# Patient Record
Sex: MALE | Race: BLACK OR AFRICAN AMERICAN | NOT HISPANIC OR LATINO | Employment: OTHER | URBAN - METROPOLITAN AREA
[De-identification: names, ages, dates, MRNs, and addresses within clinical notes are randomized per-mention and may not be internally consistent; named-entity substitution may affect disease eponyms.]

---

## 2020-01-07 PROBLEM — M62.82 NON-TRAUMATIC RHABDOMYOLYSIS: Status: ACTIVE | Noted: 2020-01-07

## 2020-01-07 PROBLEM — E87.6 HYPOKALEMIA: Status: ACTIVE | Noted: 2020-01-07

## 2020-01-07 PROBLEM — F23 ACUTE PSYCHOSIS: Status: ACTIVE | Noted: 2020-01-07

## 2020-01-07 PROBLEM — M62.82 RHABDOMYOLYSIS: Status: ACTIVE | Noted: 2020-01-07

## 2020-01-08 PROBLEM — F20.9 SCHIZOPHRENIA: Status: ACTIVE | Noted: 2020-01-08

## 2020-01-09 PROBLEM — F15.959 STIMULANT-INDUCED PSYCHOTIC DISORDER: Status: ACTIVE | Noted: 2020-01-09

## 2020-01-09 PROBLEM — F17.200 TOBACCO USE DISORDER: Status: ACTIVE | Noted: 2020-01-09

## 2020-01-09 PROBLEM — F12.10 CANNABIS USE DISORDER, MILD, ABUSE: Status: ACTIVE | Noted: 2020-01-09

## 2020-01-09 PROBLEM — F15.90 STIMULANT USE DISORDER: Status: ACTIVE | Noted: 2020-01-09

## 2020-08-17 ENCOUNTER — PATIENT OUTREACH (OUTPATIENT)
Dept: ADMINISTRATIVE | Facility: OTHER | Age: 30
End: 2020-08-17

## 2024-10-11 ENCOUNTER — HOSPITAL ENCOUNTER (EMERGENCY)
Facility: HOSPITAL | Age: 34
Discharge: PSYCHIATRIC OR SUBSTANCE USE FACILITY | End: 2024-10-12
Attending: EMERGENCY MEDICINE

## 2024-10-11 ENCOUNTER — HOSPITAL ENCOUNTER (EMERGENCY)
Facility: HOSPITAL | Age: 34
Discharge: HOME | End: 2024-10-11
Attending: FAMILY MEDICINE | Admitting: FAMILY MEDICINE

## 2024-10-11 VITALS
WEIGHT: 150 LBS | TEMPERATURE: 98 F | OXYGEN SATURATION: 100 % | RESPIRATION RATE: 16 BRPM | BODY MASS INDEX: 23.54 KG/M2 | SYSTOLIC BLOOD PRESSURE: 118 MMHG | DIASTOLIC BLOOD PRESSURE: 68 MMHG | HEIGHT: 67 IN | HEART RATE: 82 BPM

## 2024-10-11 DIAGNOSIS — R07.9 CHEST PAIN: ICD-10-CM

## 2024-10-11 DIAGNOSIS — Z91.199 NONCOMPLIANCE: ICD-10-CM

## 2024-10-11 DIAGNOSIS — F29 PSYCHOSIS, UNSPECIFIED PSYCHOSIS TYPE: ICD-10-CM

## 2024-10-11 DIAGNOSIS — F20.9 SCHIZOPHRENIA, UNSPECIFIED TYPE: Primary | ICD-10-CM

## 2024-10-11 LAB
ALBUMIN SERPL BCP-MCNC: 4.8 G/DL (ref 3.5–5)
ALBUMIN/GLOB SERPL: 1.3 {RATIO}
ALP SERPL-CCNC: 87 U/L (ref 45–115)
ALT SERPL W P-5'-P-CCNC: 23 U/L (ref 16–61)
AMPHET UR QL SCN: POSITIVE
ANION GAP SERPL CALCULATED.3IONS-SCNC: 14 MMOL/L (ref 7–16)
AST SERPL W P-5'-P-CCNC: 28 U/L (ref 15–37)
BARBITURATES UR QL SCN: NEGATIVE
BENZODIAZ METAB UR QL SCN: NEGATIVE
BILIRUB SERPL-MCNC: 1.2 MG/DL (ref ?–1.2)
BUN SERPL-MCNC: 23 MG/DL (ref 7–18)
BUN/CREAT SERPL: 21 (ref 6–20)
CALCIUM SERPL-MCNC: 10.1 MG/DL (ref 8.5–10.1)
CANNABINOIDS UR QL SCN: POSITIVE
CHLORIDE SERPL-SCNC: 98 MMOL/L (ref 98–107)
CO2 SERPL-SCNC: 23 MMOL/L (ref 21–32)
COCAINE UR QL SCN: NEGATIVE
CREAT SERPL-MCNC: 1.12 MG/DL (ref 0.7–1.3)
EGFR (NO RACE VARIABLE) (RUSH/TITUS): 90 ML/MIN/1.73M2
GLOBULIN SER-MCNC: 3.7 G/DL (ref 2–4)
GLUCOSE SERPL-MCNC: 73 MG/DL (ref 74–106)
NT-PROBNP SERPL-MCNC: 34 PG/ML (ref 1–125)
OPIATES UR QL SCN: NEGATIVE
PCP UR QL SCN: NEGATIVE
POTASSIUM SERPL-SCNC: 3.8 MMOL/L (ref 3.5–5.1)
PROT SERPL-MCNC: 8.5 G/DL (ref 6.4–8.2)
SODIUM SERPL-SCNC: 131 MMOL/L (ref 136–145)
TROPONIN I SERPL DL<=0.01 NG/ML-MCNC: 5.6 PG/ML

## 2024-10-11 PROCEDURE — 94761 N-INVAS EAR/PLS OXIMETRY MLT: CPT

## 2024-10-11 PROCEDURE — 80307 DRUG TEST PRSMV CHEM ANLYZR: CPT | Performed by: EMERGENCY MEDICINE

## 2024-10-11 PROCEDURE — 99900035 HC TECH TIME PER 15 MIN (STAT)

## 2024-10-11 PROCEDURE — 83880 ASSAY OF NATRIURETIC PEPTIDE: CPT | Performed by: FAMILY MEDICINE

## 2024-10-11 PROCEDURE — 99285 EMERGENCY DEPT VISIT HI MDM: CPT | Mod: 25

## 2024-10-11 PROCEDURE — 85025 COMPLETE CBC W/AUTO DIFF WBC: CPT | Performed by: FAMILY MEDICINE

## 2024-10-11 PROCEDURE — 80053 COMPREHEN METABOLIC PANEL: CPT | Performed by: FAMILY MEDICINE

## 2024-10-11 PROCEDURE — 36415 COLL VENOUS BLD VENIPUNCTURE: CPT | Performed by: EMERGENCY MEDICINE

## 2024-10-11 PROCEDURE — 96372 THER/PROPH/DIAG INJ SC/IM: CPT | Performed by: EMERGENCY MEDICINE

## 2024-10-11 PROCEDURE — 84484 ASSAY OF TROPONIN QUANT: CPT | Performed by: FAMILY MEDICINE

## 2024-10-11 PROCEDURE — 99284 EMERGENCY DEPT VISIT MOD MDM: CPT | Mod: 25

## 2024-10-11 PROCEDURE — 82077 ASSAY SPEC XCP UR&BREATH IA: CPT | Performed by: EMERGENCY MEDICINE

## 2024-10-11 PROCEDURE — 93005 ELECTROCARDIOGRAM TRACING: CPT

## 2024-10-11 PROCEDURE — 63600175 PHARM REV CODE 636 W HCPCS: Performed by: EMERGENCY MEDICINE

## 2024-10-11 RX ORDER — BENZTROPINE MESYLATE 1 MG/1
1 TABLET ORAL 2 TIMES DAILY
COMMUNITY
Start: 2024-07-24

## 2024-10-11 RX ORDER — TRAZODONE HYDROCHLORIDE 50 MG/1
50 TABLET ORAL NIGHTLY
COMMUNITY
Start: 2024-07-25

## 2024-10-11 RX ORDER — RISPERIDONE 4 MG/1
4 TABLET ORAL
COMMUNITY
Start: 2024-07-24

## 2024-10-11 RX ORDER — FLUOXETINE HYDROCHLORIDE 40 MG/1
40 CAPSULE ORAL
COMMUNITY
Start: 2024-08-14

## 2024-10-11 RX ORDER — BUSPIRONE HYDROCHLORIDE 10 MG/1
10 TABLET ORAL 3 TIMES DAILY
COMMUNITY
Start: 2024-07-25

## 2024-10-11 RX ORDER — HALOPERIDOL 5 MG/ML
10 INJECTION INTRAMUSCULAR
Status: COMPLETED | OUTPATIENT
Start: 2024-10-11 | End: 2024-10-11

## 2024-10-11 RX ADMIN — HALOPERIDOL LACTATE 10 MG: 5 INJECTION, SOLUTION INTRAMUSCULAR at 09:10

## 2024-10-11 NOTE — ED PROVIDER NOTES
Carinaounter Date: 10/11/2024    SCRIBE #1 NOTE: I, Nancy Torres, am scribing for, and in the presence of,  Neri Andujar DO. I have scribed the entire note.       History     Chief Complaint   Patient presents with    Chest Pain    Anxiety     34 y.o.male presented to the ED via EMS for a chest pains. He was found walking to the store. He has anxiety, chest pains, and hand cramping. Pt has HX of smoking and has a medical HX of Anxiety, Genital herpes, Psychosis, Rhabdomyolysis, Schizophrenia, and Stimulant-induced psychotic disorder.           The history is provided by the EMS personnel. No  was used.     Review of patient's allergies indicates:  No Known Allergies  Past Medical History:   Diagnosis Date    Anxiety     Genital herpes     Psychosis     Rhabdomyolysis     Schizophrenia     Stimulant-induced psychotic disorder      Past Surgical History:   Procedure Laterality Date    APPENDECTOMY       No family history on file.  Social History     Tobacco Use    Smoking status: Every Day     Current packs/day: 0.50     Types: Cigarettes    Smokeless tobacco: Never   Substance Use Topics    Alcohol use: Yes    Drug use: Yes     Types: Marijuana     Review of Systems   Cardiovascular:  Positive for chest pain.   All other systems reviewed and are negative.      Physical Exam     Initial Vitals [10/11/24 1654]   BP Pulse Resp Temp SpO2   (!) 127/55 105 (!) 24 98.4 °F (36.9 °C) 98 %      MAP       --         Physical Exam    Nursing note and vitals reviewed.  Constitutional: He appears well-developed and well-nourished.   HENT:   Head: Normocephalic and atraumatic.   Eyes: Conjunctivae and EOM are normal. Pupils are equal, round, and reactive to light.   Neck: Neck supple.   Normal range of motion.  Cardiovascular:  Normal rate, regular rhythm, normal heart sounds and intact distal pulses.           Pulmonary/Chest: Breath sounds normal.   Abdominal: Abdomen is soft. Bowel sounds are  normal.   Musculoskeletal:         General: Normal range of motion.      Cervical back: Normal range of motion and neck supple.     Neurological: He is alert and oriented to person, place, and time. He has normal strength.   Skin: Skin is warm and dry. Capillary refill takes less than 2 seconds.   Psychiatric: He has a normal mood and affect. Thought content normal.         ED Course   Procedures  Labs Reviewed   COMPREHENSIVE METABOLIC PANEL - Abnormal       Result Value    Sodium 131 (*)     Potassium 3.8      Chloride 98      CO2 23      Anion Gap 14      Glucose 73 (*)     BUN 23 (*)     Creatinine 1.12      BUN/Creatinine Ratio 21 (*)     Calcium 10.1      Total Protein 8.5 (*)     Albumin 4.8      Globulin 3.7      A/G Ratio 1.3      Bilirubin, Total 1.2      Alk Phos 87      ALT 23      AST 28      eGFR 90     TROPONIN I - Normal    Troponin I High Sensitivity 5.6     NT-PRO NATRIURETIC PEPTIDE - Normal    ProBNP 34     CBC W/ AUTO DIFFERENTIAL    Narrative:     The following orders were created for panel order CBC auto differential.  Procedure                               Abnormality         Status                     ---------                               -----------         ------                     CBC with Differential[[id number]]                            In process                   Please view results for these tests on the individual orders.   TROPONIN I   CBC WITH DIFFERENTIAL   DRUG SCREEN, URINE (BEAKER)   ALCOHOL,MEDICAL (ETHANOL)     EKG Readings: (Independently Interpreted)   Heart Rate: 91 BPM.   Interpreted by Dr. Con Fry [facility]  Rightward axis  Anteroseptal ST elevation, CONSIDER ACUTE INFARCT  Abnormal ECG  Unconfirmed diagnosis        Imaging Results    None          Medications - No data to display  Medical Decision Making  Patient feeling better.  No active complaints.  Was little agitated after waiting for lab work but lab work reassuring.  Would not give us urine for UDS.   Patient did eat a meal.  Says he is compliant with his medications does not need a refill.  Advised him return the ER if symptoms worsen or new symptoms develop    Amount and/or Complexity of Data Reviewed  Labs: ordered.              Attending Attestation:           Physician Attestation for Scribe:  Physician Attestation Statement for Scribe #1: I, Neri Andujar,DO, reviewed documentation, as scribed by Nancy Torres in my presence, and it is both accurate and complete.             ED Course as of 10/11/24 1928   Fri Oct 11, 2024   1805 Sign out.  Schizophrenia.  Bipolar.  [email or web address] store walking in parking lot.  Came by ambulance.   [PK]   1806 No SI or HI.   [PK]      ED Course User Index  [PK] Geovany Watson MD                           Clinical Impression:  Final diagnoses:  [R07.9] Chest pain  [F20.9] Schizophrenia, unspecified type (Primary)          ED Disposition Condition    Discharge Stable          ED Prescriptions    None       Follow-up Information       Follow up With Specialties Details Why Contact Info    [facility] - Emergency Department Emergency Medicine Go to  As needed, If symptoms worsen [address] [place] [id number] [phone]             Geovany Watson MD  10/11/24 1928

## 2024-10-12 VITALS
SYSTOLIC BLOOD PRESSURE: 118 MMHG | HEIGHT: 67 IN | RESPIRATION RATE: 16 BRPM | BODY MASS INDEX: 23.54 KG/M2 | TEMPERATURE: 98 F | OXYGEN SATURATION: 98 % | HEART RATE: 84 BPM | WEIGHT: 150 LBS | DIASTOLIC BLOOD PRESSURE: 67 MMHG

## 2024-10-12 LAB — ETHANOL SERPL-MCNC: 3 MG/DL

## 2024-10-12 PROCEDURE — 25000003 PHARM REV CODE 250: Performed by: EMERGENCY MEDICINE

## 2024-10-12 RX ORDER — BENZTROPINE MESYLATE 1 MG/1
1 TABLET ORAL
Status: COMPLETED | OUTPATIENT
Start: 2024-10-12 | End: 2024-10-12

## 2024-10-12 RX ORDER — RISPERIDONE 1 MG/1
4 TABLET ORAL
Status: COMPLETED | OUTPATIENT
Start: 2024-10-12 | End: 2024-10-12

## 2024-10-12 RX ORDER — FLUOXETINE HYDROCHLORIDE 20 MG/1
40 CAPSULE ORAL
Status: COMPLETED | OUTPATIENT
Start: 2024-10-12 | End: 2024-10-12

## 2024-10-12 RX ORDER — BUSPIRONE HYDROCHLORIDE 5 MG/1
10 TABLET ORAL
Status: COMPLETED | OUTPATIENT
Start: 2024-10-12 | End: 2024-10-12

## 2024-10-12 RX ADMIN — BUSPIRONE HYDROCHLORIDE 10 MG: 5 TABLET ORAL at 03:10

## 2024-10-12 RX ADMIN — BENZTROPINE MESYLATE 1 MG: 1 TABLET ORAL at 03:10

## 2024-10-12 RX ADMIN — RISPERIDONE 4 MG: 1 TABLET ORAL at 03:10

## 2024-10-12 RX ADMIN — FLUOXETINE HYDROCHLORIDE 40 MG: 20 CAPSULE ORAL at 03:10

## 2024-10-12 NOTE — ED NOTES
Dr. Watson also spoke with his brother who reports patient is from [facility]. He was been diagnosed with schizophrenia and was admitted to a mental health facility in [place] about 4 months ago. Patient has not been taking his medications for the past few weeks.

## 2024-10-12 NOTE — ED NOTES
Pt being uncooperative, started pulling out IV's.  Pt states he is ready to go.  Pt tried to be redirected back to bed by myself, Dr Watson.  Pt very determined that he is leaving.

## 2024-10-12 NOTE — ED PROVIDER NOTES
Encounter Date: 10/11/2024       History     Chief Complaint   Patient presents with    Mental Health Problem     EMS states they were called by [employer] due to the pt walking in the street and having a hx of Schizophrenia.      This is a 33 y/o male,who presents to the ED via EMS for mental health evaluation. He was seen here earlier today for chest pain and left AMA. A few hours later he came by EMS after [employer] found in the middle of the road. He has a known hx of schizophrenia but is out of his medications. There is no hx of SI or HI. There are no other complaints/pain in the ED at this time.     The history is provided by the patient and the EMS personnel. No  was used.     Review of patient's allergies indicates:  No Known Allergies  Past Medical History:   Diagnosis Date    Anxiety     Genital herpes     Psychosis     Rhabdomyolysis     Schizophrenia     Stimulant-induced psychotic disorder      Past Surgical History:   Procedure Laterality Date    APPENDECTOMY       No family history on file.  Social History     Tobacco Use    Smoking status: Every Day     Current packs/day: 0.50     Types: Cigarettes    Smokeless tobacco: Never   Substance Use Topics    Alcohol use: Yes    Drug use: Yes     Types: Marijuana     Review of Systems   Psychiatric/Behavioral:  Negative for suicidal ideas.         Mental health evaluation.      All other systems reviewed and are negative.      Physical Exam     Initial Vitals [10/11/24 2124]   BP Pulse Resp Temp SpO2   115/70 92 16 98.4 °F (36.9 °C) 98 %      MAP       --         Physical Exam    Nursing note and vitals reviewed.  Constitutional: He appears well-developed and well-nourished.   HENT:   Head: Normocephalic and atraumatic.   Eyes: EOM are normal. Pupils are equal, round, and reactive to light.   Neck: Neck supple. No thyromegaly present. No JVD present.   Normal range of motion.  Cardiovascular:  Normal rate, regular rhythm, normal heart sounds and  intact distal pulses.           No murmur heard.  Pulmonary/Chest: Breath sounds normal. No stridor. No respiratory distress. He has no wheezes.   Abdominal: Abdomen is soft. Bowel sounds are normal. He exhibits no distension. There is no abdominal tenderness.   Musculoskeletal:         General: No tenderness or edema. Normal range of motion.      Cervical back: Normal range of motion and neck supple.     Lymphadenopathy:     He has no cervical adenopathy.   Neurological: He is alert and oriented to person, place, and time. He has normal strength. No cranial nerve deficit or sensory deficit. GCS score is 15. GCS eye subscore is 4. GCS verbal subscore is 5. GCS motor subscore is 6.   Skin: Skin is warm and dry. Capillary refill takes less than 2 seconds. No rash noted.   Psychiatric: His mood appears anxious. His affect is labile. His speech is rapid and/or pressured. He is agitated, aggressive and hyperactive. He is not slowed, not withdrawn and not combative. Thought content is paranoid and delusional. Cognition and memory are impaired. He expresses impulsivity and inappropriate judgment. He expresses no homicidal and no suicidal ideation. He expresses no suicidal plans and no homicidal plans.         ED Course   Procedures  Labs Reviewed   DRUG SCREEN, URINE (BEAKER) - Abnormal       Result Value    Barbiturates, Urine Negative      Benzodiazepine, Urine Negative      Opiates, Urine Negative      Phencyclidine, Urine Negative      Amphetamine, Urine Positive (*)     Cannabinoid, Urine Positive (*)     Cocaine, Urine Negative      Narrative:     The results of screening tests should be considered presumptive. Confirmatory testing is available upon request.    Cutoff Points:  PCP:         25ng/mL  AMPH:        500ng/mL  NATIVIDAD:        200ng/mL  NOBLE:        200ng/mL  THC:         50ng/mL  SUZETTE:         300ng/mL  OPI:         2000ng/mL   ALCOHOL,MEDICAL (ETHANOL) - Normal    Ethanol 3            Imaging Results     None          Medications   haloperidol lactate injection 10 mg (10 mg Intramuscular Given 10/11/24 2135)   risperiDONE tablet 4 mg (4 mg Oral Given 10/12/24 0355)   benztropine tablet 1 mg (1 mg Oral Given 10/12/24 0350)   busPIRone tablet 10 mg (10 mg Oral Given 10/12/24 0350)   FLUoxetine capsule 40 mg (40 mg Oral Given 10/12/24 0350)     Medical Decision Making  Patient medically cleared for psychiatric placement.  Discussed also with his brother who reports patient is from [facility].  He was been diagnosed with schizophrenia and was admitted to a mental health facility in [place] about 4 months ago.  Patient was not been taking his medications for the past few weeks.  His family is originally from [place] but he does not have any family that lives nearby.  Patient has been hyperactive and delusional.  No active hallucinations no suicide ideation or homicidal ideation.    Labs were done during a earlier ER visit    1 of his main complaints in the 1st ER visit tonight is that his feet were sore from having wet boots.  When he came back to the ER he was barefooted in his feet are unremarkable appearing.  It appears that he was been airing out his feet since he left the ER    Lab work done in the earlier ER visit from same evening        Amount and/or Complexity of Data Reviewed  Labs:  Decision-making details documented in ED Course.    Risk  Prescription drug management.               ED Course as of 10/12/24 2139   Sat Oct 12, 2024   0046 Will see if we can transfer the patient to admit health facility.  Discussed with his brother. Patient was from [facility].  He has no family here.  His brother plans on leaving [place] sometime around 4:00 a.m. to come this way to get the patient.  However it would be best for patient to have a mental health placement.  This would be best to be done near [place] in his brother could take him there if accepted.  Patient has been admitted for  schizophrenia about 4 it once ago somewhere in [place].  Also believe he was been in some institutions in the [place] are [PK]   0136 Amphetamine, Urine(!): Positive [PK]   0136 Cannabinoid Scrn, Ur(!): Positive [PK]   0136 Amphetamine, Urine(!): Positive [PK]   0136 Cannabinoid Scrn, Ur(!): Positive [PK]   0136 Amphetamine, Urine(!): Positive [PK]   0136 Cannabinoid Scrn, Ur(!): Positive [PK]   0136 Amphetamine, Urine(!): Positive [PK]      ED Course User Index  [PK] Geovany Watson MD                           Clinical Impression:  Final diagnoses:  [F20.9] Schizophrenia, unspecified type (Primary)  [F29] Psychosis, unspecified psychosis type  [Z91.199] Noncompliance          ED Disposition Condition    Transfer to Another Facility Stable                Geovany Watson MD  10/12/24 0045       Geovany Watson MD  10/12/24 [id number]

## 2024-10-12 NOTE — ED NOTES
Dr. Watson spoke with patient's brother again to inform about patient's acceptance to [facility] in [place]. Dr. Watson advised brother to come  patient when he got off of work in the morning to transport patient to facility. Patient's brother will get off of work at 7am and get to his girlfriend's house in [place], [place] and will sleep until 8am. Then states he will be on the way to our facility to pick patient up. Transfer Center made aware patient's brother will be taking patient and will arrive to their facility sometime after lunch.

## 2024-10-12 NOTE — ED NOTES
10/12/24 1238 Called brother at this time. States he is providing transportation for patient to [facility]. States approx. 4 hours from facility. Called report to JOSE Motta at [facility] at this time and notified that patient was approx 4 hours away.

## 2024-10-12 NOTE — DISCHARGE INSTRUCTIONS
Make sure you are taking your psychiatric medications regularly.  Return if symptoms worsen or new symptoms develop

## 2024-10-12 NOTE — ED NOTES
Ginacorona Mesandekaterina Douglass, a 34 y.o. male presents to the ED via [facility] with complaint of mental health problem    Triage note:  Chief Complaint   Patient presents with    Mental Health Problem     EMS states they were called by [employer] due to the pt walking in the street and having a hx of Schizophrenia.      Review of patient's allergies indicates:  No Known Allergies  Past Medical History:   Diagnosis Date    Anxiety     Genital herpes     Psychosis     Rhabdomyolysis     Schizophrenia     Stimulant-induced psychotic disorder

## 2024-10-14 LAB
BASOPHILS # BLD AUTO: 0.04 K/UL (ref 0–0.2)
BASOPHILS NFR BLD AUTO: 0.6 % (ref 0–1)
DIFFERENTIAL METHOD BLD: ABNORMAL
EOSINOPHIL # BLD AUTO: 0.14 K/UL (ref 0–0.5)
EOSINOPHIL NFR BLD AUTO: 2.1 % (ref 1–4)
ERYTHROCYTE [DISTWIDTH] IN BLOOD BY AUTOMATED COUNT: 12.2 % (ref 11.5–14.5)
HCT VFR BLD AUTO: 43.2 % (ref 40–54)
HGB BLD-MCNC: 14.5 G/DL (ref 13.5–18)
IMM GRANULOCYTES # BLD AUTO: 0.03 K/UL (ref 0–0.04)
IMM GRANULOCYTES NFR BLD: 0.5 % (ref 0–0.4)
LYMPHOCYTES # BLD AUTO: 2.74 K/UL (ref 1–4.8)
LYMPHOCYTES NFR BLD AUTO: 41.3 % (ref 27–41)
MCH RBC QN AUTO: 28.7 PG (ref 27–31)
MCHC RBC AUTO-ENTMCNC: 33.6 G/DL (ref 32–36)
MCV RBC AUTO: 85.5 FL (ref 80–96)
MONOCYTES # BLD AUTO: 0.82 K/UL (ref 0–0.8)
MONOCYTES NFR BLD AUTO: 12.4 % (ref 2–6)
MPC BLD CALC-MCNC: 10.3 FL (ref 9.4–12.4)
NEUTROPHILS # BLD AUTO: 2.86 K/UL (ref 1.8–7.7)
NEUTROPHILS NFR BLD AUTO: 43.1 % (ref 53–65)
NRBC # BLD AUTO: 0 X10E3/UL
NRBC, AUTO (.00): 0 %
PLATELET # BLD AUTO: 291 K/UL (ref 150–400)
RBC # BLD AUTO: 5.05 M/UL (ref 4.6–6.2)
WBC # BLD AUTO: 6.63 K/UL (ref 4.5–11)

## 2024-10-15 LAB
OHS QRS DURATION: 82 MS
OHS QTC CALCULATION: 404 MS